# Patient Record
Sex: MALE | Race: WHITE | NOT HISPANIC OR LATINO | Employment: OTHER | URBAN - METROPOLITAN AREA
[De-identification: names, ages, dates, MRNs, and addresses within clinical notes are randomized per-mention and may not be internally consistent; named-entity substitution may affect disease eponyms.]

---

## 2021-07-14 ENCOUNTER — HOSPITAL ENCOUNTER (OUTPATIENT)
Facility: HOSPITAL | Age: 61
Discharge: HOME | End: 2021-07-16
Attending: FAMILY MEDICINE | Admitting: INTERNAL MEDICINE

## 2021-07-14 DIAGNOSIS — I20.0 UNSTABLE ANGINA (HCC): Primary | ICD-10-CM

## 2021-07-14 PROBLEM — R07.9 CHEST PAIN: Status: ACTIVE | Noted: 2021-07-14

## 2021-07-14 PROCEDURE — G0378 HOSPITAL OBSERVATION PER HR: HCPCS

## 2021-07-14 PROCEDURE — 93005 ELECTROCARDIOGRAM TRACING: CPT | Performed by: PHYSICIAN ASSISTANT

## 2021-07-14 PROCEDURE — 99220 PR INITIAL OBSERVATION CARE/DAY 70 MINUTES: CPT | Performed by: FAMILY MEDICINE

## 2021-07-14 PROCEDURE — G0379 DIRECT REFER HOSPITAL OBSERV: HCPCS

## 2021-07-14 NOTE — NURSING NOTE
"  ACC REVIEW REPORT: [facility]        PATIENT NAME: Kenroy Rajan    PATIENT ID: [id number]        COVID-19 ACC SCREENING       DOES THE PATIENT HAVE A FEVER GREATER THAN OR EQUAL .4: NO    IS THE PATIENT EXPERIENCING SHORTNESS OF BREATH: NO    DOES THE PATIENT HAVE A COUGH: NO  DOES THE PATIENT HAVE ANY OF THE FOLLOWING RISK FACTORS:    EXPOSURE TO SUSPECTED OR KNOWN COVID-19: NO    RECENT TRAVEL HISTORY TO ENDEMIC AREA (DOMESTIC/LOCAL): NO    IS THE PATIENT A HEALTHCARE WORKER: NO    HAS THE PATIENT EXPERIENCED A LOSS OF SENSE OF TASTE OR SMELL: NO    HAS THE PATIENT BEEN TESTED FOR COVID-19: YES    DATE TESTED: 07/11/21   IT WAS NEGATIVE    LAB TESTING SENT TO:           BED: 9801    BED TYPE: [facility]    BED GIVEN TO: MYNOR URIBE RN     TIME BED GIVEN: 1340    TODAY'S DATE: 7/14/2021    TRANSFER DATE: 07/14/2021    ETA:     TRANSFERRING FACILITY: [facility]    TRANSFERRING FACILITY PHONE # : [phone]    TRANSFERRING MD:     DATE/TIME REQUEST RECEIVED: 07/14/2021    [facility] RN:  CARLOS MANUEL CAMACHO    REPORT FROM: MYNOR URIBE RN     TIME REPORT TAKEN: 1340    DIAGNOSIS: UNSTABLE ANGINA    REASON FOR TRANSFER TO [facility]: HIGHER LEVEL CARE     TRANSPORTATION: GROUND    CLINICAL REASON FOR TRANSFER TO [facility]: ADMITTED ON THE 10TH FOR BACK PAIN & CHEST PAIN.  HE WAS APPARENTLY SCHEDULED FOR A HEART CATH AT [place] BUT THEY HAD NO BEDS.  A FEW WEEKS AGO HE HAD PNEUMONIA PER REPORT.  HE'S BEEN RUNNING NSR ON THE MONITOR.    HE HAS AN OPIOID DEPENDENCE AND IS ON SUBOXONE .  HIS NURSE SAYS THAT HE'S COMPLAINING MORE ABOUT HIS BACK THAN CHEST PAIN AND WANTS PAIN MEDICATION.  HOWEVER, SHE CONTINUES TO TELL HIM THAT HE CAN'T HAVE ANYTHING ELSE.  HE FINALLY RECEIVED TORADOL AS A \"ONE TIME DOSE.\"       HE IS DIABETIC, HAS COPD, A CABG, HEP B AND HEP C.        CLINICAL INFORMATION    HEIGHT:     WEIGHT:     ALLERGIES: AMITRIPTYLINE, PAXIL, BUSBAR    INFECTIOUS DISEASE:     ISOLATION:     VITAL SIGNS:   TIME: " 1336  TEMP: 97.0  PULSE: 93  B/P: 154/83  RESP: 20  96% ON RMA        LAB INFORMATION: CREAT-1.32, H&H-28.8/9.3    CULTURE INFORMATION:     MEDS/IV FLUIDS: HE WILL HAVE AN IV BEFORE HE LEAVES.  HE TOOK A SHOWER AND APPARENTLY HIS IV CAME OUT.        CARDIAC SYSTEM:    CHEST PAIN:     RATE:     SCALE:     RHYTHM: NSR    Is patient taking or has patient been given any drugs that could increase bleeding? YES    DRUG:  ASA 81 MG    DOSE/FREQUENCY:     TROPONIN:    DATE:   TIME:   TROP:     DATE:   TIME:   TROP:           CARDIAC NOTES: NSR      RESPIRATORY SYSTEM:    LUNG SOUNDS:     ABG DATE:         ABG TIME:     ABG RESULTS:    PH:   PCO2:   PO2:   HCO3:   O2 SAT:       OXYGEN:     O2 SAT:     IMAGING FINDINGS:     PNEUMO CHEST TUBE SEAL TYPE:     RESPIRATORY STATUS:       CNS/MUSCULOSKELETAL    ALERT AND ORIENTED: YES ALERT AND ORIENTED X 4     PERSON:   PLACE:   TIME:     CAT SCAN RESULTS:     MRI RESULTS:     CNS/MUSCULOSKELETAL NOTES:       GI//GY      ABDOMINAL PAIN:    VOMITING:     DIARRHEA:     NAUSEA:     BOWEL SOUNDS:     OCCULT STOOL:     HEMATURIA:     NG TUBE:    SIZE:     DATE INSERTED:       ULTRASOUND RESULTS:     ACUTE ABDOMEN RESULTS:     CT SCAN RESULTS:       GI//GY NOTES:     [place]:     PAST MEDICAL HISTORY: TWO BACK SURGERIES, CHRONIC BACK PAIN, HTN., DIABETES, COPD, OPIOID DEPENDENCE, CABG, HEP B AND HEP C    OTHER SYMPTOM NOTES:     ADDITIONAL NOTES:           Carlie Freire RN  7/14/2021  13:21 EDT

## 2021-07-15 ENCOUNTER — APPOINTMENT (OUTPATIENT)
Dept: CT IMAGING | Facility: HOSPITAL | Age: 61
End: 2021-07-15

## 2021-07-15 ENCOUNTER — APPOINTMENT (OUTPATIENT)
Dept: GENERAL RADIOLOGY | Facility: HOSPITAL | Age: 61
End: 2021-07-15

## 2021-07-15 PROBLEM — I20.0 UNSTABLE ANGINA (HCC): Status: ACTIVE | Noted: 2021-07-15

## 2021-07-15 LAB
ALBUMIN SERPL-MCNC: 3.1 G/DL (ref 3.5–5.2)
ALBUMIN/GLOB SERPL: 0.9 G/DL
ALP SERPL-CCNC: 63 U/L (ref 39–117)
ALT SERPL W P-5'-P-CCNC: 10 U/L (ref 1–41)
ANION GAP SERPL CALCULATED.3IONS-SCNC: 12 MMOL/L (ref 5–15)
AST SERPL-CCNC: 16 U/L (ref 1–40)
BASOPHILS # BLD AUTO: 0.03 10*3/MM3 (ref 0–0.2)
BASOPHILS NFR BLD AUTO: 0.8 % (ref 0–1.5)
BILIRUB SERPL-MCNC: 0.2 MG/DL (ref 0–1.2)
BILIRUB UR QL STRIP: NEGATIVE
BILIRUB UR QL STRIP: NEGATIVE
BUN SERPL-MCNC: 17 MG/DL (ref 8–23)
BUN/CREAT SERPL: 15.7 (ref 7–25)
CALCIUM SPEC-SCNC: 9.2 MG/DL (ref 8.6–10.5)
CHLORIDE SERPL-SCNC: 100 MMOL/L (ref 98–107)
CHOLEST SERPL-MCNC: 135 MG/DL (ref 0–200)
CLARITY UR: CLEAR
CLARITY UR: CLEAR
CO2 SERPL-SCNC: 25 MMOL/L (ref 22–29)
COLOR UR: YELLOW
COLOR UR: YELLOW
CREAT SERPL-MCNC: 1.08 MG/DL (ref 0.76–1.27)
D DIMER PPP FEU-MCNC: 0.62 MCGFEU/ML (ref 0–0.56)
DEPRECATED RDW RBC AUTO: 47.5 FL (ref 37–54)
EOSINOPHIL # BLD AUTO: 0.28 10*3/MM3 (ref 0–0.4)
EOSINOPHIL NFR BLD AUTO: 7 % (ref 0.3–6.2)
ERYTHROCYTE [DISTWIDTH] IN BLOOD BY AUTOMATED COUNT: 14.4 % (ref 12.3–15.4)
FLUAV RNA RESP QL NAA+PROBE: NOT DETECTED
FLUBV RNA RESP QL NAA+PROBE: NOT DETECTED
GFR SERPL CREATININE-BSD FRML MDRD: 70 ML/MIN/1.73
GLOBULIN UR ELPH-MCNC: 3.4 GM/DL
GLUCOSE BLDC GLUCOMTR-MCNC: 117 MG/DL (ref 70–130)
GLUCOSE BLDC GLUCOMTR-MCNC: 118 MG/DL (ref 70–130)
GLUCOSE BLDC GLUCOMTR-MCNC: 301 MG/DL (ref 70–130)
GLUCOSE BLDC GLUCOMTR-MCNC: 350 MG/DL (ref 70–130)
GLUCOSE SERPL-MCNC: 112 MG/DL (ref 65–99)
GLUCOSE UR STRIP-MCNC: ABNORMAL MG/DL
GLUCOSE UR STRIP-MCNC: ABNORMAL MG/DL
HAV IGM SERPL QL IA: ABNORMAL
HBA1C MFR BLD: 10.7 % (ref 4.8–5.6)
HBV CORE IGM SERPL QL IA: ABNORMAL
HBV SURFACE AG SERPL QL IA: ABNORMAL
HCT VFR BLD AUTO: 30.7 % (ref 37.5–51)
HCV AB SER DONR QL: REACTIVE
HDLC SERPL-MCNC: 26 MG/DL (ref 40–60)
HGB BLD-MCNC: 9.6 G/DL (ref 13–17.7)
HGB UR QL STRIP.AUTO: NEGATIVE
HGB UR QL STRIP.AUTO: NEGATIVE
IMM GRANULOCYTES # BLD AUTO: 0.01 10*3/MM3 (ref 0–0.05)
IMM GRANULOCYTES NFR BLD AUTO: 0.3 % (ref 0–0.5)
INR PPP: 1.02 (ref 0.85–1.16)
KETONES UR QL STRIP: NEGATIVE
KETONES UR QL STRIP: NEGATIVE
LDLC SERPL CALC-MCNC: 73 MG/DL (ref 0–100)
LDLC/HDLC SERPL: 2.54 {RATIO}
LEUKOCYTE ESTERASE UR QL STRIP.AUTO: NEGATIVE
LEUKOCYTE ESTERASE UR QL STRIP.AUTO: NEGATIVE
LIPASE SERPL-CCNC: 11 U/L (ref 13–60)
LYMPHOCYTES # BLD AUTO: 1.27 10*3/MM3 (ref 0.7–3.1)
LYMPHOCYTES NFR BLD AUTO: 31.8 % (ref 19.6–45.3)
MAGNESIUM SERPL-MCNC: 1.7 MG/DL (ref 1.6–2.4)
MCH RBC QN AUTO: 28.1 PG (ref 26.6–33)
MCHC RBC AUTO-ENTMCNC: 31.3 G/DL (ref 31.5–35.7)
MCV RBC AUTO: 89.8 FL (ref 79–97)
MONOCYTES # BLD AUTO: 0.44 10*3/MM3 (ref 0.1–0.9)
MONOCYTES NFR BLD AUTO: 11 % (ref 5–12)
NEUTROPHILS NFR BLD AUTO: 1.96 10*3/MM3 (ref 1.7–7)
NEUTROPHILS NFR BLD AUTO: 49.1 % (ref 42.7–76)
NITRITE UR QL STRIP: NEGATIVE
NITRITE UR QL STRIP: NEGATIVE
NRBC BLD AUTO-RTO: 0 /100 WBC (ref 0–0.2)
NT-PROBNP SERPL-MCNC: 106.3 PG/ML (ref 0–900)
PH UR STRIP.AUTO: 7.5 [PH] (ref 5–8)
PH UR STRIP.AUTO: <=5 [PH] (ref 5–8)
PLAT MORPH BLD: NORMAL
PLATELET # BLD AUTO: 262 10*3/MM3 (ref 140–450)
PMV BLD AUTO: 10.9 FL (ref 6–12)
POTASSIUM SERPL-SCNC: 4.5 MMOL/L (ref 3.5–5.2)
PROCALCITONIN SERPL-MCNC: 0.11 NG/ML (ref 0–0.25)
PROT SERPL-MCNC: 6.5 G/DL (ref 6–8.5)
PROT UR QL STRIP: NEGATIVE
PROT UR QL STRIP: NEGATIVE
PROTHROMBIN TIME: 13.1 SECONDS (ref 11.4–14.4)
RBC # BLD AUTO: 3.42 10*6/MM3 (ref 4.14–5.8)
RBC MORPH BLD: NORMAL
SARS-COV-2 RNA RESP QL NAA+PROBE: NOT DETECTED
SODIUM SERPL-SCNC: 137 MMOL/L (ref 136–145)
SP GR UR STRIP: 1.02 (ref 1–1.03)
SP GR UR STRIP: 1.02 (ref 1–1.03)
TRIGL SERPL-MCNC: 215 MG/DL (ref 0–150)
TROPONIN T SERPL-MCNC: 0.01 NG/ML (ref 0–0.03)
TSH SERPL DL<=0.05 MIU/L-ACNC: 1.08 UIU/ML (ref 0.27–4.2)
UROBILINOGEN UR QL STRIP: ABNORMAL
UROBILINOGEN UR QL STRIP: ABNORMAL
VLDLC SERPL-MCNC: 36 MG/DL (ref 5–40)
WBC # BLD AUTO: 3.99 10*3/MM3 (ref 3.4–10.8)
WBC MORPH BLD: NORMAL

## 2021-07-15 PROCEDURE — 94640 AIRWAY INHALATION TREATMENT: CPT

## 2021-07-15 PROCEDURE — 71275 CT ANGIOGRAPHY CHEST: CPT

## 2021-07-15 PROCEDURE — 87636 SARSCOV2 & INF A&B AMP PRB: CPT | Performed by: FAMILY MEDICINE

## 2021-07-15 PROCEDURE — 94799 UNLISTED PULMONARY SVC/PX: CPT

## 2021-07-15 PROCEDURE — 83036 HEMOGLOBIN GLYCOSYLATED A1C: CPT | Performed by: PHYSICIAN ASSISTANT

## 2021-07-15 PROCEDURE — 96365 THER/PROPH/DIAG IV INF INIT: CPT

## 2021-07-15 PROCEDURE — 80074 ACUTE HEPATITIS PANEL: CPT | Performed by: PHYSICIAN ASSISTANT

## 2021-07-15 PROCEDURE — C1894 INTRO/SHEATH, NON-LASER: HCPCS | Performed by: INTERNAL MEDICINE

## 2021-07-15 PROCEDURE — C1769 GUIDE WIRE: HCPCS | Performed by: INTERNAL MEDICINE

## 2021-07-15 PROCEDURE — 25010000002 ONDANSETRON PER 1 MG: Performed by: INTERNAL MEDICINE

## 2021-07-15 PROCEDURE — 81003 URINALYSIS AUTO W/O SCOPE: CPT | Performed by: PHYSICIAN ASSISTANT

## 2021-07-15 PROCEDURE — 99225 PR SBSQ OBSERVATION CARE/DAY 25 MINUTES: CPT | Performed by: INTERNAL MEDICINE

## 2021-07-15 PROCEDURE — 25010000002 MORPHINE PER 10 MG: Performed by: FAMILY MEDICINE

## 2021-07-15 PROCEDURE — 25010000002 MAGNESIUM SULFATE 2 GM/50ML SOLUTION: Performed by: INTERNAL MEDICINE

## 2021-07-15 PROCEDURE — 25010000002 PHENYLEPHRINE 10 MG/ML SOLUTION: Performed by: INTERNAL MEDICINE

## 2021-07-15 PROCEDURE — 93459 L HRT ART/GRFT ANGIO: CPT | Performed by: INTERNAL MEDICINE

## 2021-07-15 PROCEDURE — 96375 TX/PRO/DX INJ NEW DRUG ADDON: CPT

## 2021-07-15 PROCEDURE — 25010000002 HEPARIN (PORCINE) PER 1000 UNITS: Performed by: PHYSICIAN ASSISTANT

## 2021-07-15 PROCEDURE — 25010000002 MIDAZOLAM PER 1 MG: Performed by: INTERNAL MEDICINE

## 2021-07-15 PROCEDURE — 63710000001 INSULIN LISPRO (HUMAN) PER 5 UNITS: Performed by: INTERNAL MEDICINE

## 2021-07-15 PROCEDURE — 85610 PROTHROMBIN TIME: CPT | Performed by: PHYSICIAN ASSISTANT

## 2021-07-15 PROCEDURE — 0 IOPAMIDOL PER 1 ML: Performed by: FAMILY MEDICINE

## 2021-07-15 PROCEDURE — 96366 THER/PROPH/DIAG IV INF ADDON: CPT

## 2021-07-15 PROCEDURE — 82962 GLUCOSE BLOOD TEST: CPT

## 2021-07-15 PROCEDURE — 80061 LIPID PANEL: CPT | Performed by: PHYSICIAN ASSISTANT

## 2021-07-15 PROCEDURE — 25010000002 FENTANYL CITRATE (PF) 50 MCG/ML SOLUTION: Performed by: INTERNAL MEDICINE

## 2021-07-15 PROCEDURE — 84145 PROCALCITONIN (PCT): CPT | Performed by: PHYSICIAN ASSISTANT

## 2021-07-15 PROCEDURE — G0378 HOSPITAL OBSERVATION PER HR: HCPCS

## 2021-07-15 PROCEDURE — 83690 ASSAY OF LIPASE: CPT | Performed by: PHYSICIAN ASSISTANT

## 2021-07-15 PROCEDURE — 0 IOPAMIDOL PER 1 ML: Performed by: INTERNAL MEDICINE

## 2021-07-15 PROCEDURE — 96372 THER/PROPH/DIAG INJ SC/IM: CPT

## 2021-07-15 PROCEDURE — 84484 ASSAY OF TROPONIN QUANT: CPT | Performed by: PHYSICIAN ASSISTANT

## 2021-07-15 PROCEDURE — 71045 X-RAY EXAM CHEST 1 VIEW: CPT

## 2021-07-15 PROCEDURE — 96376 TX/PRO/DX INJ SAME DRUG ADON: CPT

## 2021-07-15 PROCEDURE — 25010000002 HEPARIN (PORCINE) PER 1000 UNITS: Performed by: INTERNAL MEDICINE

## 2021-07-15 PROCEDURE — C1760 CLOSURE DEV, VASC: HCPCS | Performed by: INTERNAL MEDICINE

## 2021-07-15 PROCEDURE — 85379 FIBRIN DEGRADATION QUANT: CPT | Performed by: PHYSICIAN ASSISTANT

## 2021-07-15 PROCEDURE — 80050 GENERAL HEALTH PANEL: CPT | Performed by: PHYSICIAN ASSISTANT

## 2021-07-15 PROCEDURE — 93010 ELECTROCARDIOGRAM REPORT: CPT | Performed by: INTERNAL MEDICINE

## 2021-07-15 PROCEDURE — 83880 ASSAY OF NATRIURETIC PEPTIDE: CPT | Performed by: PHYSICIAN ASSISTANT

## 2021-07-15 PROCEDURE — 81003 URINALYSIS AUTO W/O SCOPE: CPT | Performed by: INTERNAL MEDICINE

## 2021-07-15 PROCEDURE — 83735 ASSAY OF MAGNESIUM: CPT | Performed by: PHYSICIAN ASSISTANT

## 2021-07-15 PROCEDURE — 25010000002 MORPHINE PER 10 MG: Performed by: INTERNAL MEDICINE

## 2021-07-15 PROCEDURE — 85007 BL SMEAR W/DIFF WBC COUNT: CPT | Performed by: PHYSICIAN ASSISTANT

## 2021-07-15 RX ORDER — QUETIAPINE FUMARATE 25 MG/1
25 TABLET, FILM COATED ORAL 2 TIMES DAILY
COMMUNITY

## 2021-07-15 RX ORDER — SODIUM CHLORIDE 9 MG/ML
INJECTION, SOLUTION INTRAVENOUS CONTINUOUS PRN
Status: COMPLETED | OUTPATIENT
Start: 2021-07-15 | End: 2021-07-15

## 2021-07-15 RX ORDER — MIDAZOLAM HYDROCHLORIDE 1 MG/ML
INJECTION INTRAMUSCULAR; INTRAVENOUS AS NEEDED
Status: DISCONTINUED | OUTPATIENT
Start: 2021-07-15 | End: 2021-07-15 | Stop reason: HOSPADM

## 2021-07-15 RX ORDER — QUETIAPINE FUMARATE 25 MG/1
25 TABLET, FILM COATED ORAL 2 TIMES DAILY
Status: DISCONTINUED | OUTPATIENT
Start: 2021-07-15 | End: 2021-07-16 | Stop reason: HOSPADM

## 2021-07-15 RX ORDER — ALPRAZOLAM 0.25 MG/1
0.25 TABLET ORAL 3 TIMES DAILY PRN
Status: DISCONTINUED | OUTPATIENT
Start: 2021-07-15 | End: 2021-07-16 | Stop reason: HOSPADM

## 2021-07-15 RX ORDER — PANTOPRAZOLE SODIUM 40 MG/1
40 TABLET, DELAYED RELEASE ORAL DAILY
Status: DISCONTINUED | OUTPATIENT
Start: 2021-07-15 | End: 2021-07-15

## 2021-07-15 RX ORDER — NICOTINE 21 MG/24HR
1 PATCH, TRANSDERMAL 24 HOURS TRANSDERMAL
Status: DISCONTINUED | OUTPATIENT
Start: 2021-07-15 | End: 2021-07-16 | Stop reason: HOSPADM

## 2021-07-15 RX ORDER — HYDROCODONE BITARTRATE AND ACETAMINOPHEN 5; 325 MG/1; MG/1
1 TABLET ORAL EVERY 4 HOURS PRN
Status: DISCONTINUED | OUTPATIENT
Start: 2021-07-15 | End: 2021-07-16 | Stop reason: HOSPADM

## 2021-07-15 RX ORDER — MULTIPLE VITAMINS W/ MINERALS TAB 9MG-400MCG
1 TAB ORAL DAILY
Status: DISCONTINUED | OUTPATIENT
Start: 2021-07-15 | End: 2021-07-16 | Stop reason: HOSPADM

## 2021-07-15 RX ORDER — SODIUM CHLORIDE 0.9 % (FLUSH) 0.9 %
10 SYRINGE (ML) INJECTION AS NEEDED
Status: DISCONTINUED | OUTPATIENT
Start: 2021-07-15 | End: 2021-07-16 | Stop reason: HOSPADM

## 2021-07-15 RX ORDER — LISINOPRIL 5 MG/1
5 TABLET ORAL DAILY
COMMUNITY
End: 2021-07-16 | Stop reason: HOSPADM

## 2021-07-15 RX ORDER — METOPROLOL TARTRATE 50 MG/1
50 TABLET, FILM COATED ORAL EVERY 12 HOURS SCHEDULED
Status: DISCONTINUED | OUTPATIENT
Start: 2021-07-15 | End: 2021-07-16 | Stop reason: HOSPADM

## 2021-07-15 RX ORDER — HYDROCODONE BITARTRATE AND ACETAMINOPHEN 5; 325 MG/1; MG/1
1 TABLET ORAL ONCE AS NEEDED
Status: COMPLETED | OUTPATIENT
Start: 2021-07-15 | End: 2021-07-15

## 2021-07-15 RX ORDER — SODIUM CHLORIDE 0.9 % (FLUSH) 0.9 %
10 SYRINGE (ML) INJECTION EVERY 12 HOURS SCHEDULED
Status: DISCONTINUED | OUTPATIENT
Start: 2021-07-15 | End: 2021-07-16 | Stop reason: HOSPADM

## 2021-07-15 RX ORDER — HEPARIN SODIUM 5000 [USP'U]/ML
5000 INJECTION, SOLUTION INTRAVENOUS; SUBCUTANEOUS EVERY 8 HOURS SCHEDULED
Status: DISCONTINUED | OUTPATIENT
Start: 2021-07-15 | End: 2021-07-16 | Stop reason: HOSPADM

## 2021-07-15 RX ORDER — DEXTROSE MONOHYDRATE 25 G/50ML
25 INJECTION, SOLUTION INTRAVENOUS
Status: DISCONTINUED | OUTPATIENT
Start: 2021-07-15 | End: 2021-07-16 | Stop reason: HOSPADM

## 2021-07-15 RX ORDER — MAGNESIUM SULFATE HEPTAHYDRATE 40 MG/ML
2 INJECTION, SOLUTION INTRAVENOUS AS NEEDED
Status: DISCONTINUED | OUTPATIENT
Start: 2021-07-15 | End: 2021-07-16 | Stop reason: HOSPADM

## 2021-07-15 RX ORDER — BUPRENORPHINE HYDROCHLORIDE AND NALOXONE HYDROCHLORIDE DIHYDRATE 8; 2 MG/1; MG/1
2 TABLET SUBLINGUAL DAILY
Status: DISCONTINUED | OUTPATIENT
Start: 2021-07-15 | End: 2021-07-16 | Stop reason: HOSPADM

## 2021-07-15 RX ORDER — GABAPENTIN 800 MG/1
800 TABLET ORAL EVERY 6 HOURS SCHEDULED
COMMUNITY
End: 2021-07-16 | Stop reason: HOSPADM

## 2021-07-15 RX ORDER — ALBUTEROL SULFATE 90 UG/1
AEROSOL, METERED RESPIRATORY (INHALATION) EVERY 4 HOURS PRN
COMMUNITY

## 2021-07-15 RX ORDER — ISOSORBIDE MONONITRATE 120 MG/1
120 TABLET, EXTENDED RELEASE ORAL DAILY
COMMUNITY

## 2021-07-15 RX ORDER — ACETAMINOPHEN 650 MG/1
650 SUPPOSITORY RECTAL EVERY 4 HOURS PRN
Status: DISCONTINUED | OUTPATIENT
Start: 2021-07-15 | End: 2021-07-16 | Stop reason: HOSPADM

## 2021-07-15 RX ORDER — FENTANYL CITRATE 50 UG/ML
INJECTION, SOLUTION INTRAMUSCULAR; INTRAVENOUS AS NEEDED
Status: DISCONTINUED | OUTPATIENT
Start: 2021-07-15 | End: 2021-07-15 | Stop reason: HOSPADM

## 2021-07-15 RX ORDER — ALBUTEROL SULFATE 2.5 MG/3ML
2.5 SOLUTION RESPIRATORY (INHALATION)
Status: DISCONTINUED | OUTPATIENT
Start: 2021-07-15 | End: 2021-07-16 | Stop reason: HOSPADM

## 2021-07-15 RX ORDER — DOCUSATE SODIUM 100 MG/1
100 CAPSULE, LIQUID FILLED ORAL 2 TIMES DAILY
COMMUNITY

## 2021-07-15 RX ORDER — NICOTINE POLACRILEX 4 MG
15 LOZENGE BUCCAL
Status: DISCONTINUED | OUTPATIENT
Start: 2021-07-15 | End: 2021-07-16 | Stop reason: HOSPADM

## 2021-07-15 RX ORDER — ATORVASTATIN CALCIUM 40 MG/1
40 TABLET, FILM COATED ORAL NIGHTLY
Status: DISCONTINUED | OUTPATIENT
Start: 2021-07-15 | End: 2021-07-16 | Stop reason: HOSPADM

## 2021-07-15 RX ORDER — MAGNESIUM SULFATE 1 G/100ML
1 INJECTION INTRAVENOUS AS NEEDED
Status: DISCONTINUED | OUTPATIENT
Start: 2021-07-15 | End: 2021-07-16 | Stop reason: HOSPADM

## 2021-07-15 RX ORDER — PANTOPRAZOLE SODIUM 40 MG/1
40 TABLET, DELAYED RELEASE ORAL 2 TIMES DAILY
Status: ON HOLD | COMMUNITY
End: 2021-07-15

## 2021-07-15 RX ORDER — ASPIRIN 81 MG/1
81 TABLET, CHEWABLE ORAL DAILY
Status: DISCONTINUED | OUTPATIENT
Start: 2021-07-15 | End: 2021-07-16 | Stop reason: HOSPADM

## 2021-07-15 RX ORDER — MORPHINE SULFATE 2 MG/ML
2 INJECTION, SOLUTION INTRAMUSCULAR; INTRAVENOUS EVERY 4 HOURS PRN
Status: DISCONTINUED | OUTPATIENT
Start: 2021-07-15 | End: 2021-07-16

## 2021-07-15 RX ORDER — GABAPENTIN 400 MG/1
800 CAPSULE ORAL EVERY 6 HOURS SCHEDULED
Status: DISCONTINUED | OUTPATIENT
Start: 2021-07-15 | End: 2021-07-16 | Stop reason: HOSPADM

## 2021-07-15 RX ORDER — LIDOCAINE HYDROCHLORIDE 10 MG/ML
INJECTION, SOLUTION EPIDURAL; INFILTRATION; INTRACAUDAL; PERINEURAL AS NEEDED
Status: DISCONTINUED | OUTPATIENT
Start: 2021-07-15 | End: 2021-07-15 | Stop reason: HOSPADM

## 2021-07-15 RX ORDER — ACETAMINOPHEN 325 MG/1
650 TABLET ORAL EVERY 4 HOURS PRN
Status: DISCONTINUED | OUTPATIENT
Start: 2021-07-15 | End: 2021-07-16 | Stop reason: HOSPADM

## 2021-07-15 RX ORDER — PHENYLEPHRINE HYDROCHLORIDE 10 MG/ML
INJECTION INTRAVENOUS AS NEEDED
Status: DISCONTINUED | OUTPATIENT
Start: 2021-07-15 | End: 2021-07-15 | Stop reason: HOSPADM

## 2021-07-15 RX ORDER — LISINOPRIL 5 MG/1
5 TABLET ORAL DAILY
Status: DISCONTINUED | OUTPATIENT
Start: 2021-07-15 | End: 2021-07-16 | Stop reason: HOSPADM

## 2021-07-15 RX ORDER — METOPROLOL TARTRATE 50 MG/1
50 TABLET, FILM COATED ORAL 2 TIMES DAILY
COMMUNITY

## 2021-07-15 RX ORDER — PANTOPRAZOLE SODIUM 40 MG/10ML
40 INJECTION, POWDER, LYOPHILIZED, FOR SOLUTION INTRAVENOUS EVERY 12 HOURS SCHEDULED
Status: DISCONTINUED | OUTPATIENT
Start: 2021-07-15 | End: 2021-07-16 | Stop reason: HOSPADM

## 2021-07-15 RX ORDER — LIDOCAINE 50 MG/G
1 PATCH TOPICAL
Status: DISCONTINUED | OUTPATIENT
Start: 2021-07-15 | End: 2021-07-16 | Stop reason: HOSPADM

## 2021-07-15 RX ORDER — TEMAZEPAM 7.5 MG/1
7.5 CAPSULE ORAL NIGHTLY PRN
Status: DISCONTINUED | OUTPATIENT
Start: 2021-07-15 | End: 2021-07-16 | Stop reason: HOSPADM

## 2021-07-15 RX ORDER — ONDANSETRON 2 MG/ML
4 INJECTION INTRAMUSCULAR; INTRAVENOUS EVERY 6 HOURS PRN
Status: DISCONTINUED | OUTPATIENT
Start: 2021-07-15 | End: 2021-07-16 | Stop reason: HOSPADM

## 2021-07-15 RX ORDER — MAGNESIUM SULFATE HEPTAHYDRATE 40 MG/ML
4 INJECTION, SOLUTION INTRAVENOUS AS NEEDED
Status: DISCONTINUED | OUTPATIENT
Start: 2021-07-15 | End: 2021-07-16 | Stop reason: HOSPADM

## 2021-07-15 RX ORDER — ACETAMINOPHEN 160 MG/5ML
650 SOLUTION ORAL EVERY 4 HOURS PRN
Status: DISCONTINUED | OUTPATIENT
Start: 2021-07-15 | End: 2021-07-16 | Stop reason: HOSPADM

## 2021-07-15 RX ORDER — IPRATROPIUM BROMIDE AND ALBUTEROL SULFATE 2.5; .5 MG/3ML; MG/3ML
3 SOLUTION RESPIRATORY (INHALATION) EVERY 4 HOURS PRN
Status: DISCONTINUED | OUTPATIENT
Start: 2021-07-15 | End: 2021-07-16 | Stop reason: HOSPADM

## 2021-07-15 RX ORDER — DOCUSATE SODIUM 100 MG/1
100 CAPSULE, LIQUID FILLED ORAL 2 TIMES DAILY PRN
Status: DISCONTINUED | OUTPATIENT
Start: 2021-07-15 | End: 2021-07-16 | Stop reason: HOSPADM

## 2021-07-15 RX ORDER — OMEPRAZOLE 20 MG/1
20 CAPSULE, DELAYED RELEASE ORAL DAILY
COMMUNITY

## 2021-07-15 RX ORDER — ASPIRIN 81 MG/1
81 TABLET, CHEWABLE ORAL DAILY
COMMUNITY

## 2021-07-15 RX ORDER — ISOSORBIDE MONONITRATE 60 MG/1
120 TABLET, EXTENDED RELEASE ORAL DAILY
Status: DISCONTINUED | OUTPATIENT
Start: 2021-07-15 | End: 2021-07-16 | Stop reason: HOSPADM

## 2021-07-15 RX ORDER — CLOPIDOGREL BISULFATE 75 MG/1
75 TABLET ORAL DAILY
Status: DISCONTINUED | OUTPATIENT
Start: 2021-07-15 | End: 2021-07-16 | Stop reason: HOSPADM

## 2021-07-15 RX ADMIN — GABAPENTIN 800 MG: 400 CAPSULE ORAL at 11:34

## 2021-07-15 RX ADMIN — LISINOPRIL 5 MG: 5 TABLET ORAL at 08:40

## 2021-07-15 RX ADMIN — CLOPIDOGREL BISULFATE 75 MG: 75 TABLET ORAL at 08:39

## 2021-07-15 RX ADMIN — QUETIAPINE FUMARATE 25 MG: 25 TABLET ORAL at 21:09

## 2021-07-15 RX ADMIN — ATORVASTATIN CALCIUM 40 MG: 40 TABLET, FILM COATED ORAL at 21:09

## 2021-07-15 RX ADMIN — SODIUM CHLORIDE, PRESERVATIVE FREE 10 ML: 5 INJECTION INTRAVENOUS at 02:08

## 2021-07-15 RX ADMIN — SODIUM CHLORIDE, PRESERVATIVE FREE 10 ML: 5 INJECTION INTRAVENOUS at 21:11

## 2021-07-15 RX ADMIN — ALBUTEROL SULFATE 2.5 MG: 2.5 SOLUTION RESPIRATORY (INHALATION) at 03:01

## 2021-07-15 RX ADMIN — INSULIN LISPRO 6 UNITS: 100 INJECTION, SOLUTION INTRAVENOUS; SUBCUTANEOUS at 18:22

## 2021-07-15 RX ADMIN — METOPROLOL TARTRATE 50 MG: 50 TABLET, FILM COATED ORAL at 08:44

## 2021-07-15 RX ADMIN — ALBUTEROL SULFATE 2.5 MG: 2.5 SOLUTION RESPIRATORY (INHALATION) at 18:50

## 2021-07-15 RX ADMIN — LIDOCAINE 1 PATCH: 50 PATCH CUTANEOUS at 11:34

## 2021-07-15 RX ADMIN — ONDANSETRON 4 MG: 2 INJECTION INTRAMUSCULAR; INTRAVENOUS at 12:43

## 2021-07-15 RX ADMIN — ALBUTEROL SULFATE 2.5 MG: 2.5 SOLUTION RESPIRATORY (INHALATION) at 07:31

## 2021-07-15 RX ADMIN — TEMAZEPAM 7.5 MG: 7.5 CAPSULE ORAL at 21:09

## 2021-07-15 RX ADMIN — ALBUTEROL SULFATE 2.5 MG: 2.5 SOLUTION RESPIRATORY (INHALATION) at 22:46

## 2021-07-15 RX ADMIN — MORPHINE SULFATE 2 MG: 2 INJECTION, SOLUTION INTRAMUSCULAR; INTRAVENOUS at 04:11

## 2021-07-15 RX ADMIN — QUETIAPINE FUMARATE 25 MG: 25 TABLET ORAL at 02:08

## 2021-07-15 RX ADMIN — GABAPENTIN 800 MG: 400 CAPSULE ORAL at 08:39

## 2021-07-15 RX ADMIN — MORPHINE SULFATE 2 MG: 2 INJECTION, SOLUTION INTRAMUSCULAR; INTRAVENOUS at 08:45

## 2021-07-15 RX ADMIN — MAGNESIUM SULFATE HEPTAHYDRATE 2 G: 2 INJECTION, SOLUTION INTRAVENOUS at 11:52

## 2021-07-15 RX ADMIN — GABAPENTIN 800 MG: 400 CAPSULE ORAL at 02:08

## 2021-07-15 RX ADMIN — METOPROLOL TARTRATE 50 MG: 50 TABLET, FILM COATED ORAL at 02:08

## 2021-07-15 RX ADMIN — HEPARIN SODIUM 5000 UNITS: 5000 INJECTION INTRAVENOUS; SUBCUTANEOUS at 08:45

## 2021-07-15 RX ADMIN — Medication 1 TABLET: at 08:38

## 2021-07-15 RX ADMIN — GABAPENTIN 800 MG: 400 CAPSULE ORAL at 18:22

## 2021-07-15 RX ADMIN — ISOSORBIDE MONONITRATE 120 MG: 60 TABLET, EXTENDED RELEASE ORAL at 08:38

## 2021-07-15 RX ADMIN — BUPRENORPHINE AND NALOXONE 2 TABLET: 8; 2 TABLET SUBLINGUAL at 13:33

## 2021-07-15 RX ADMIN — SODIUM CHLORIDE, PRESERVATIVE FREE 10 ML: 5 INJECTION INTRAVENOUS at 08:39

## 2021-07-15 RX ADMIN — IOPAMIDOL 70 ML: 755 INJECTION, SOLUTION INTRAVENOUS at 05:43

## 2021-07-15 RX ADMIN — PANTOPRAZOLE SODIUM 40 MG: 40 INJECTION, POWDER, FOR SOLUTION INTRAVENOUS at 08:39

## 2021-07-15 RX ADMIN — Medication 1 PATCH: at 02:08

## 2021-07-15 RX ADMIN — QUETIAPINE FUMARATE 25 MG: 25 TABLET ORAL at 08:51

## 2021-07-15 RX ADMIN — ASPIRIN 81 MG: 81 TABLET, CHEWABLE ORAL at 08:38

## 2021-07-15 RX ADMIN — ONDANSETRON 4 MG: 2 INJECTION INTRAMUSCULAR; INTRAVENOUS at 21:08

## 2021-07-15 RX ADMIN — HEPARIN SODIUM 5000 UNITS: 5000 INJECTION INTRAVENOUS; SUBCUTANEOUS at 21:09

## 2021-07-15 RX ADMIN — HYDROCODONE BITARTRATE AND ACETAMINOPHEN 1 TABLET: 5; 325 TABLET ORAL at 16:45

## 2021-07-15 RX ADMIN — MORPHINE SULFATE 2 MG: 2 INJECTION, SOLUTION INTRAMUSCULAR; INTRAVENOUS at 21:08

## 2021-07-15 RX ADMIN — HYDROCODONE BITARTRATE AND ACETAMINOPHEN 1 TABLET: 5; 325 TABLET ORAL at 03:04

## 2021-07-15 NOTE — PLAN OF CARE
Goal Outcome Evaluation:  Plan of Care Reviewed With: patient   Pt  arrived via [facility]. EMS--A/Ox4, VSS, Pt states he goes to a Suboxone clinic due to past opioid abuse. C/O pain in his lower back. He is unable to name all of his medications, so med req done using transfer documentation from [facility]  per RASHEL Moyer. Pt states his spouse can give an accurate list of medications when she gets here tomorrow morning. Upon assessment by Dr. Hickman, the pt reported he was having chest pain. When this writer went to give pt pain medication per order her stated his pain was in his lower back and radiated to his abdomen. Transfer documentation, including a CD are in the pts chart. Pt reports having had Covid while incarcerated in 2020 and has since received the vaccines.       Progress: no change

## 2021-07-15 NOTE — H&P
[facility] Medicine Services  HISTORY AND PHYSICAL    Patient Name: Kenroy Rajan  : 1960  MRN: [id number]  Primary Care Physician: Amari Vera MD  Date of admission: 2021    Subjective   Subjective     Chief Complaint:  Chest pain     HPI:  Kenroy Rajan is a 60 y.o. male with a medical history significant for coronary artery disease s/p CABG and stenting, hypertension, hyperlipidemia, type 2 diabetes mellitus, and chronic back pain on Suboxone who presented to outside hospital on 21 with acute encephalopathy and hyperglycemia.  Upon arrival to the ED, the patient's main complaint was chest pain and nausea. The patient was treated for HHS and evaluated for unstable angina. During his stay in the OSH ED, his glucose improved with fluids and insulin and the troponin's remained negative. CXR demonstrated an improvement in the ground glass infiltrates seen in recent imaging. CT of the abdomen pelvis revealed a small hiatal hernia and gastritis. There were no beds available at [facility] so the patient was transferred to [facility] for higher level of care.     The patient states he has been having worsening chest pain for several days. The pain is substernal and radiates to his left arm. It is associated with diaphoresis and shortness of breath. The discomfort waxes and wanes and he rates it an 8/10 at its worst. He describes it as a squeezing sensation. The patient states he uses oxygen at home PRN. The patient has had a CABG x3 and 3 stents placed. No cardiac intervention in the past 3 years. Currently taking ASA and Plavix. Nitroglycerin relieves some of the pain. At baseline, the patient is pretty sedentary and he does not exert himself. He admits to smoking 1 PPD but denies alcohol or drug use. The patient lives at home with his wife.      COVID Details:        Symptoms: [] NONE [] Fever []  Cough [x] Shortness of breath [] Change in taste or smell  The  patient qualifies to receive the vaccine, but they have not yet received it.    Review of Systems   Constitutional: Positive for diaphoresis. Negative for chills, fatigue and fever.   HENT: Negative for congestion, sore throat and trouble swallowing.    Eyes: Negative for pain and visual disturbance.   Respiratory: Positive for chest tightness and shortness of breath. Negative for cough and wheezing.    Cardiovascular: Positive for chest pain (numbness to the left of strenum, radiates into left arm and numbness in left hand ) and leg swelling. Negative for palpitations.   Gastrointestinal: Positive for abdominal pain. Negative for blood in stool, constipation, diarrhea, nausea and vomiting.   Genitourinary: Negative for difficulty urinating and dysuria.   Musculoskeletal: Positive for arthralgias, back pain and myalgias. Negative for gait problem.   Skin: Negative for rash and wound.   Neurological: Positive for numbness (numbness in left hand ). Negative for dizziness, syncope, speech difficulty, weakness and headaches.   Hematological: Negative for adenopathy. Does not bruise/bleed easily.   Psychiatric/Behavioral: Negative for agitation and confusion.      All other systems reviewed and are negative.     Personal History     Past Medical History:   Diagnosis Date   • AMI (acute myocardial infarction) (CMS/HCC)    • CAD (coronary artery disease)    • Diabetes (CMS/HCC)    • Hepatitis    • Hypertension    • Pneumonia    • PVD (peripheral vascular disease) (CMS/HCC)        Past Surgical History:   Procedure Laterality Date   • LEFT HEART CATH     • NC REANOMAL CORON ART PA ORIGIN BY GRAFT         Family History:  family history includes Diabetes in his brother, brother, brother, sister, and sister; Heart attack in his father; Heart disease in his brother, brother, brother, father, mother, sister, and sister; Seizures in his brother; Sudden death in his father. Otherwise pertinent FHx was reviewed and unremarkable.      Social History:  reports that he has been smoking cigarettes. He has been smoking about 1.00 pack per day. He does not have any smokeless tobacco history on file. He reports previous alcohol use. He reports previous drug use.  Social History     Social History Narrative    Live at home with his wife. Grandchildren live nearby.        Medications:  Buprenorphine HCl, Multiple Vitamins-Minerals, QUEtiapine, albuterol sulfate HFA, aspirin, docusate sodium, gabapentin, insulin detemir, insulin lispro, isosorbide mononitrate, lisinopril, metFORMIN, metoprolol tartrate, and omeprazole    Allergies   Allergen Reactions   • Paroxetine Hcl Anaphylaxis   • Amitriptyline Confusion   • Buspirone Hcl Confusion       Objective   Objective     Vital Signs:   Temp:  [98 °F (36.7 °C)] 98 °F (36.7 °C)  Heart Rate:  [78-84] 81  Resp:  [16] 16  BP: (112)/(70) 112/70    Physical Exam   Constitutional: Awake, alert, well groomed, lying in bed, appears uncomfortable   Eyes: PERRLA, sclerae anicteric, no conjunctival injection, left eyelid drooping  HENT: NCAT, mucous membranes moist, face symmetric, dentition normal   Neck: Supple, no thyromegaly, no lymphadenopathy, trachea midline, no meningeal signs   Respiratory: Clear to auscultation bilaterally, no audible wheezes, nonlabored respirations, able to speak in complete sentences   Cardiovascular: RRR, no murmurs appreciated, palpable PT pulses bilaterally, chest wall tender to palpation  Gastrointestinal: Positive bowel sounds, soft, diffuse abdominal tenderness, no distention, no guarding, no peritoneal signs    Musculoskeletal: No bilateral ankle edema, no clubbing or cyanosis to extremities  Psychiatric: Appropriate affect, cooperative, thought process and content normal   Neurologic: Oriented x 3, moves all extremities, normal tone, strength and sensation symmetric in all extremities, Cranial Nerves grossly intact to confrontation, speech clear  Skin: Cool dry, no rashes, wounds  or lesions noted, turgor normal     Result Review:  I have personally reviewed the results from the time of this admission to 07/14/21 11:53 PM EDT and agree with these findings:  []  Laboratory  []  Microbiology  []  Radiology  []  EKG/Telemetry   []  Cardiology/Vascular   []  Pathology  []  Old records  []  Other:  Most notable findings include: See HPI      LAB RESULTS:      Lab 07/15/21  0120   WBC 3.99   HEMOGLOBIN 9.6*   HEMATOCRIT 30.7*   PLATELETS 262   NEUTROS ABS 1.96   IMMATURE GRANS (ABS) 0.01   LYMPHS ABS 1.27   MONOS ABS 0.44   EOS ABS 0.28   MCV 89.8   PROCALCITONIN 0.11   PROTIME 13.1   INR 1.02   D DIMER QUANT 0.62*         Lab 07/15/21  0120   SODIUM 137   POTASSIUM 4.5   CHLORIDE 100   CO2 25.0   ANION GAP 12.0   BUN 17   CREATININE 1.08   GLUCOSE 112*   CALCIUM 9.2   MAGNESIUM 1.7   HEMOGLOBIN A1C 10.70*   TSH 1.080         Lab 07/15/21  0120   TOTAL PROTEIN 6.5   ALBUMIN 3.10*   GLOBULIN 3.4   ALT (SGPT) 10   AST (SGOT) 16   BILIRUBIN 0.2   ALK PHOS 63   LIPASE 11*         Lab 07/15/21  0120   PROBNP 106.3   TROPONIN T 0.012         Lab 07/15/21  0120   CHOLESTEROL 135   LDL CHOL 73   HDL CHOL 26*   TRIGLYCERIDES 215*               Microbiology Results (last 10 days)     ** No results found for the last 240 hours. **          No radiology results from the last 24 hrs        Assessment/Plan   Assessment & Plan       Unstable angina (CMS/HCC)    Chest pain    Hepatitis    Hypertension    Diabetes (CMS/[facility])    Hyperlipidemia    Kenroy Rajan is a 60 y.o. male with a medical history significant for coronary artery disease s/p CABG and stenting, hypertension, hyperlipidemia, type 2 diabetes mellitus, and chronic back pain on Suboxone who was transferred from an OSH for evaluation of chest pain.    Chest pain   Coronary artery disease s/p CABG  - check a troponin, trend   - obtain ECG now and repeat in the morning   - consult cardiology, patient accepted by Dr. Watkins   - continue ASA, start  Plavix  - NTG prn   - keep npo for possible procedure   - check a1c, flp, tsh, proBNP, and d-dimer  - chest x-ray    - monitor on telemetry with continuous pulse ox     Type 2 diabetes mellitus   - hold home hypoglycemics   - low dose SSI with 15 units of Levemir nightly   - check A1c    Hypertension   - blood pressure controlled   - continue lisinopril, metoprolol with hold parameters    Hyperlipidemia   - continue statin   - check lipid panel    Chronic back pain   - continue Suboxone  - lidocaine patch, heating pad      Tobacco use   - duo-nebs PRN  - nicotine patch   - smoking cessation education     DVT prophylaxis: SQH    CODE STATUS:    Level Of Support Discussed With: Patient  Code Status: CPR  Medical Interventions (Level of Support Prior to Arrest): Full    This note has been completed as part of a split-shared workflow.   Signature: Electronically signed by Teri Nixon PA-C, 07/15/21, 12:23 AM EDT          Attending   Admission Attestation       I have seen and examined the patient, performing an independent face-to-face diagnostic evaluation with plan of care reviewed and developed with the advanced practice clinician (APC).      Brief Summary Statement:   Kenroy Rajan is a 60 y.o. male past medical history of coronary artery disease status post CABG and stents.  Also with history of HTN, HLD, and T2DM, also on Suboxone for chronic low back pain.  Patient was admitted to [facility] on 7/11/2021 with acute encephalopathy and hyperglycemia.  Patient was treated for HHS and unstable angina.  Troponins remain negative. CT of the abdomen and pelvis noted small hiatal hernia and gastritis. Pt was transferred to [facility] for higher level of care.  He was accepted by cardiology and was to be admitted to [facility].  However due to late arrival was not possible to admit patient to [facility].  Patient describes his pain as substernal pain that radiates into his left upper extremity  that is 8 out of 10.  He states that is associated shortness of breath and diaphoresis.  Due to persistent chest pain and history of coronary artery disease patient will be admitted to telemetry and further evaluated by cardiology in the a.m.      Remainder of detailed HPI is as noted by APC and has been reviewed and/or edited by me for completeness.    Attending Physical Exam:  Constitutional: Awake, alert  Eyes: PERRLA, sclerae anicteric, no conjunctival injection  HENT: NCAT, mucous membranes moist  Neck: Supple, no thyromegaly, no lymphadenopathy, trachea midline  Respiratory: Clear to auscultation bilaterally, nonlabored respirations   Cardiovascular: RRR, no murmurs, rubs, or gallops, palpable pedal pulses bilaterally  Gastrointestinal: Positive bowel sounds, soft, nontender, nondistended  Musculoskeletal: No bilateral ankle edema, no clubbing or cyanosis to extremities  Psychiatric: Appropriate affect, cooperative  Neurologic: Oriented x 3, strength symmetric in all extremities, Cranial Nerves grossly intact to confrontation, speech clear  Skin: No rashes    Brief Assessment/Plan :  See detailed assessment and plan developed with APC which I have reviewed and/or edited for completeness.    Admission Status: I believe that this patient meets INPATIENT status due to unstable angina.  I feel patient’s risk for adverse outcomes and need for care warrant INPATIENT evaluation and I predict the patient’s care encounter to likely last beyond 2 midnights.      Katelin Hickman,   07/15/21

## 2021-07-15 NOTE — CONSULTS
Pre-Cardiac Catheterization Report  Cardiovascular Laboratory  [facility]      Patient:  Kenroy Rajan  :  1960  PCP:  Amari Vera MD  PHONE:  [phone]    DATE: 7/15/2021    BRIEF HPI:  Kenroy Rajan is a 60 y.o. male with hypertension, hypercholesterolemia, diabetes mellitus, ongoing tobacco abuse, family history of coronary artery disease, and known coronary artery disease.  He is post previous CABG and stents.  He is complaining of a 1 week history of chest pain which he describes as a squeezing.  He says it is moderate to severe in nature lasting minutes with radiation to his left arm.  Associated symptoms include shortness of breath and dyspnea on exertion.  His symptoms occur randomly and are sometimes relieved with sublingual nitroglycerin and at other times not.  He has been transferred to [facility] for left heart catheterization with possible intervention.    Cardiac Risk Factors:  advanced age (older than 55 for men, 65 for women), diabetes mellitus, dyslipidemia, family history of premature cardiovascular disease, hypertension, male gender, obesity (BMI >= 30 kg/m2), smoking/ tobacco exposure    Anginal class in last 2 weeks:  CCS class III    CHF Class in last 2 weeks:  NYHA Class II    Cardiogenic shock:  no    Cardiac arrest <24 hours:  no    Stress test within last 6 months:   no   Details:    Previous cardiac catheterization:  yes  Details:     Previous CABG:  yes  Details:      Allergies:     IV contrast allergy:  no  Allergies   Allergen Reactions   • Paroxetine Hcl Anaphylaxis   • Amitriptyline Confusion   • Buspirone Hcl Confusion       MEDICATIONS:  Prior to Admission medications    Medication Sig Start Date End Date Taking? Authorizing Provider   albuterol sulfate  (90 Base) MCG/ACT inhaler Inhale Every 4 (Four) Hours As Needed for Wheezing.   Yes Provider, MD Pascual   aspirin 81 MG chewable tablet Chew 81 mg Daily.   Yes Provider,  MD Pascual   BUPRENORPHINE HCL SL Place 16 mg under the tongue Daily.   Yes Provider, Historical, MD   docusate sodium (COLACE) 100 MG capsule Take 100 mg by mouth 2 (Two) Times a Day.   Yes Provider, Historical, MD   gabapentin (NEURONTIN) 800 MG tablet Take 800 mg by mouth Every 6 (Six) Hours.   Yes Provider, Historical, MD   insulin detemir (LEVEMIR) 100 UNIT/ML injection Inject 35 Units under the skin into the appropriate area as directed Daily.   Yes Provider, Historical, MD   insulin lispro (humaLOG) 100 UNIT/ML injection 5-10 Units 3 (Three) Times a Day Before Meals.   Yes Provider, Historical, MD   isosorbide mononitrate (IMDUR) 120 MG 24 hr tablet Take 120 mg by mouth Daily.   Yes Provider, Historical, MD   lisinopril (PRINIVIL,ZESTRIL) 5 MG tablet Take 5 mg by mouth Daily.   Yes Provider, Historical, MD   metFORMIN (GLUCOPHAGE) 1000 MG tablet Take 1,000 mg by mouth 2 (Two) Times a Day With Meals.   Yes Provider, Historical, MD   metoprolol tartrate (LOPRESSOR) 50 MG tablet Take 50 mg by mouth 2 (Two) Times a Day.   Yes Provider, Historical, MD   Multiple Vitamins-Minerals (MULTIVITAMIN ADULT, MINERALS, PO) Take 1 tablet by mouth Daily.   Yes Provider, Historical, MD   omeprazole (priLOSEC) 20 MG capsule Take 20 mg by mouth Daily.   Yes Provider, Historical, MD   QUEtiapine (SEROquel) 25 MG tablet Take 25 mg by mouth 2 (Two) Times a Day.   Yes Provider, Historical, MD   pantoprazole (PROTONIX) 40 MG EC tablet Take 40 mg by mouth 2 (two) times a day.  7/15/21 Yes Provider, Historical, MD       Past medical & surgical history, social and family history reviewed in the electronic medical record.    ROS:  Cardiovascular ROS: positive for - chest pain, dyspnea on exertion and shortness of breath    Physical Exam:    Vitals:   Vitals:    07/15/21 0731   BP:    Pulse:    Resp:    Temp:    SpO2: 97%          07/15/21  0028   Weight: 88.9 kg (196 lb)       General Appearance:    Alert, cooperative, in no acute  distress   Head:    Normocephalic, without obvious abnormality, atraumatic   Eyes:            Lids and lashes normal, conjunctivae and sclerae normal, no   icterus, no pallor, corneas clear, PERRLA   Ears:    Ears appear intact with no abnormalities noted   Neck:   No adenopathy, supple, trachea midline, no thyromegaly, no   carotid bruit, no JVD   Back:     No kyphosis present, no scoliosis present, range of motion normal   Lungs:     Clear to auscultation,respirations regular, even and                unlabored    Heart:    Regular rhythm and normal rate, normal S1 and S2, no         murmur, no gallop, no rub, no click   Chest Wall:    No abnormalities observed   Abdomen:     Normal bowel sounds, no masses, no organomegaly, soft     nontender, nondistended, no guarding, no rebound                tenderness   Rectal:     Deferred   Extremities:   Moves all extremities well, no edema, no cyanosis, no           redness   Pulses:   Pulses palpable and equal bilaterally   Skin:   No bleeding, bruising or rash   Neurologic:   Cranial nerves 2 - 12 grossly intact, sensation intact     Barbaeu Test:  Left: Normal  (oxymetric Allens) Right: Not Assessed     Results from last 7 days   Lab Units 07/15/21  0120   SODIUM mmol/L 137   POTASSIUM mmol/L 4.5   CHLORIDE mmol/L 100   CO2 mmol/L 25.0   BUN mg/dL 17   CREATININE mg/dL 1.08   GLUCOSE mg/dL 112*   CALCIUM mg/dL 9.2     Results from last 7 days   Lab Units 07/15/21  0120   WBC 10*3/mm3 3.99   HEMOGLOBIN g/dL 9.6*   HEMATOCRIT % 30.7*   PLATELETS 10*3/mm3 262     Lab Results   Component Value Date    TRIG 215 (H) 07/15/2021    HDL 26 (L) 07/15/2021    AST 16 07/15/2021    ALT 10 07/15/2021     Results from last 7 days   Lab Units 07/15/21  0120   HEMOGLOBIN A1C % 10.70*       Impression      · Unstable angina    Plan     · Procedure to perform: [facility]  · Planned access: Per RASHEL Lyon   07/15/21  07:58 EDT

## 2021-07-15 NOTE — PROGRESS NOTES
"    [facility] Medicine Services  PROGRESS NOTE    Patient Name: Kenroy Rajan  : 1960  MRN: [id number]    Date of Admission: 2021  Primary Care Physician: Amari Vera MD    Subjective   Subjective     CC:  Chest pain    HPI:  Chronic diffuse body pain. No dyspnea. No chest \"pressure\" currently    ROS:  No f/c  No headache  No focal weakness    Objective   Objective     Vital Signs:   Temp:  [97.9 °F (36.6 °C)-98.2 °F (36.8 °C)] 98.2 °F (36.8 °C)  Heart Rate:  [68-87] 73  Resp:  [16] 16  BP: (112-123)/(70-72) 123/72  Flow (L/min):  [2] 2     Physical Exam:  Constitutional:Alert, oriented x 3, nontoxic appearing  Psych:Normal/appropriate affect  HEENT:Ncat, oroph clear  Neck: neck supple, full range of motion  Neuro: Face symmetric, speech clear, equal , moves all extremities  Cardiac: Rrr; No pretibial pitting edema  Resp: Ctab, normal effort  GI: abd soft, mild epigastric tenderness, no rebound or guarding  Skin: No extremity rash  Musculoskeletal/extremities: no cyanosis extremities; no significant ankle edema      Results Reviewed:  LAB RESULTS:      Lab 07/15/21  0120   WBC 3.99   HEMOGLOBIN 9.6*   HEMATOCRIT 30.7*   PLATELETS 262   NEUTROS ABS 1.96   IMMATURE GRANS (ABS) 0.01   LYMPHS ABS 1.27   MONOS ABS 0.44   EOS ABS 0.28   MCV 89.8   PROCALCITONIN 0.11   PROTIME 13.1   D DIMER QUANT 0.62*         Lab 07/15/21  0120   SODIUM 137   POTASSIUM 4.5   CHLORIDE 100   CO2 25.0   ANION GAP 12.0   BUN 17   CREATININE 1.08   GLUCOSE 112*   CALCIUM 9.2   MAGNESIUM 1.7   HEMOGLOBIN A1C 10.70*   TSH 1.080         Lab 07/15/21  0120   TOTAL PROTEIN 6.5   ALBUMIN 3.10*   GLOBULIN 3.4   ALT (SGPT) 10   AST (SGOT) 16   BILIRUBIN 0.2   ALK PHOS 63   LIPASE 11*         Lab 07/15/21  0120   PROBNP 106.3   TROPONIN T 0.012   PROTIME 13.1   INR 1.02         Lab 07/15/21  0120   CHOLESTEROL 135   LDL CHOL 73   HDL CHOL 26*   TRIGLYCERIDES 215*             Brief Urine Lab Results  (Last " result in the past 365 days)      Color   Clarity   Blood   Leuk Est   Nitrite   Protein   CREAT   Urine HCG        07/15/21 0304 Yellow Clear Negative Negative Negative Negative               Microbiology Results Abnormal     Procedure Component Value - Date/Time    COVID PRE-OP / PRE-PROCEDURE SCREENING ORDER (NO ISOLATION) - Swab, Nasopharynx [[id number]]  (Normal) Collected: 07/15/21 0215    Lab Status: Final result Specimen: Swab from Nasopharynx Updated: 07/15/21 0414    Narrative:      The following orders were created for panel order COVID PRE-OP / PRE-PROCEDURE SCREENING ORDER (NO ISOLATION) - Swab, Nasopharynx.  Procedure                               Abnormality         Status                     ---------                               -----------         ------                     COVID-19 and FLU A/B PCR...[[id number]]  Normal              Final result                 Please view results for these tests on the individual orders.    COVID-19 and FLU A/B PCR - Swab, Nasopharynx [[id number]]  (Normal) Collected: 07/15/21 0215    Lab Status: Final result Specimen: Swab from Nasopharynx Updated: 07/15/21 0414     COVID19 Not Detected     Influenza A PCR Not Detected     Influenza B PCR Not Detected    Narrative:      Fact sheet for providers: https://www.fda.gov/media/886909/download    Fact sheet for patients: https://www.fda.gov/media/515080/download    Test performed by PCR.          XR Chest 1 View    Result Date: 7/15/2021  CR Chest 1 Vw INDICATION: Chest pain today. COMPARISON:  None available. FINDINGS: Single portable AP view(s) of the chest. The heart and mediastinal contours are normal. The lungs are clear. No pneumothorax or pleural effusion. Patient is status post CABG.     Impression: No acute cardiopulmonary findings. Signer Name: Nathanael Courtney MD  Signed: 7/15/2021 1:09 AM  Workstation Name: [email or web address]  [facility]    CT Angiogram Chest    Result Date: 7/15/2021  CTA  Chest INDICATION: Acute onset of chest pain TECHNIQUE: CT angiogram of the chest with 100 IV contrast. 3-D reconstructions were obtained and reviewed.   Radiation dose reduction techniques included automated exposure control or exposure modulation based on body size. Count of known CT and cardiac nuc med studies performed in previous 12 months: 0. COMPARISON: No pertinent prior study FINDINGS: Contrast timing is appropriate for adequate sensitivity. The main pulmonary trunk is of normal caliber. No filling defects in the central, lobar, or segmental pulmonary arteries. No interventricular septal bowing or hepatic reflux of contrast to suggest right heart strain. Heart size is normal. No pericardial effusion. The aorta is non aneurysmal without evidence of dissection. Central airways are patent. No endobronchial lesions. Minimal bibasilar atelectasis. No focal consolidation. No pleural effusion or pneumothorax. No threshold enlarged mediastinal, hilar or axillary lymph nodes. No acute findings in visualized upper abdomen. Regional osseous structures show no acute or aggressive bone lesions.     Impression: Impression: 1. No evidence of pulmonary embolus on this study. 2. No acute radiographic abnormality is demonstrated. Signer Name: Trung Soni MD  Signed: 7/15/2021 5:55 AM  Workstation Name: [id number]  [facility]          I have reviewed the medications:  Scheduled Meds:albuterol, 2.5 mg, Nebulization, Q4H - RT  aspirin, 81 mg, Oral, Daily  buprenorphine-naloxone, 2 tablet, Sublingual, Daily  clopidogrel, 75 mg, Oral, Daily  gabapentin, 800 mg, Oral, Q6H  heparin (porcine), 5,000 Units, Subcutaneous, Q8H  [START ON 7/16/2021] insulin detemir, 15 Units, Subcutaneous, Nightly  insulin lispro, 0-7 Units, Subcutaneous, TID AC  isosorbide mononitrate, 120 mg, Oral, Daily  lidocaine, 1 patch, Transdermal, Q24H  lisinopril, 5 mg, Oral, Daily  metoprolol tartrate, 50 mg, Oral,  Q12H  multivitamin with minerals, 1 tablet, Oral, Daily  nicotine, 1 patch, Transdermal, Q24H  pantoprazole, 40 mg, Oral, Daily  QUEtiapine, 25 mg, Oral, BID  sodium chloride, 10 mL, Intravenous, Q12H      Continuous Infusions:   PRN Meds:.•  acetaminophen **OR** acetaminophen **OR** acetaminophen  •  dextrose  •  dextrose  •  docusate sodium  •  glucagon (human recombinant)  •  ipratropium-albuterol  •  Morphine  •  sodium chloride    Assessment/Plan   Assessment & Plan     Active Hospital Problems    Diagnosis  POA   • **Unstable angina (CMS/HCC) [I20.0]  Unknown   • Hepatitis [K75.9]  Yes   • Hypertension [I10]  Yes   • Diabetes (CMS/HCC) [E11.9]  Yes   • Hyperlipidemia [E78.5]  Yes   • Chest pain [R07.9]  Yes      Resolved Hospital Problems   No resolved problems to display.        Brief Hospital Course to date:  Kenroy Rajan is a 60 y.o. male smoker  w/ hx cad (previous cabg & stenting), htn, hl, dm2 who presented to outside hospital ED 7/11/21 w/ confusion, chest pain and nausea. Patient had profound hyperglycemia and was treated for HHS and unstable angina w/ mentation and glucose normalizing w/ fluids and insulin and troponins remained negative. Ct a/p revealed gastritis and small hiatal hernia. Patient had been awaiting transfer to higher level of care and no beds were available at nearby [facility] and patient was ultimately accepted here at [facility] by cardiology Dr. Watkins. Patient reportedly worsening chest pain for several days radiating to left arm w/ associated diaphoresis and dyspnea.Upon arrival here Ekg nsr w/ nonspecific flattening inferiorly (no prior for comparison), troponin negative; D-dimer minimally evelated a 0.62.Ct angio chest negative. Lipase & LfT's ok. Had LHC performed 7/15/21 which showed patents grafts and normal EF. Initiated on bid ppi    Non cardiac chest pain  Hx CAD (previous cabg/stents)  -s/p LHC 7/15/21 by Dr. Watkins: patent 3/3 bypass grafts w/ acceptable  "revascularization and normal LV systolic function  -ct angio chest negative for PE  -dapt, statin, lisinopril, metoprolol, imdur  HTN  DM2 uncontrolled (a1c 10.7)  -adjust insulins prn  Chronic back pain  Hx previous opioid abuse/current suboxone use  -continue neurontin & home suboxone  Hiatal hernia and gastritis (on outside ct a/p)  -on bid ppi; avoid nsaids  Hep C + ab  -completed therapy for this per patient report  Normocytic anemia (hgb 9.6 at admission)  Ongoing tobacco abuse    Plan:  -heart cath \"negative\", ct angio \"negative\"  -bid ppi, avoid nsaids for possible gi source (outside ct a/p w/ gastritis)  -start diet  -titrate insulins  -if tolerates diet and stable then possibly d/c tomorrow    Am labs: cbc,bmp    DVT prophylaxis:  Medical DVT prophylaxis orders are present. sq heparin      Disposition: I expect the patient to be discharged TBD    CODE STATUS:   Code Status and Medical Interventions:   Ordered at: 07/15/21 0035     Level Of Support Discussed With:    Patient     Code Status:    CPR     Medical Interventions (Level of Support Prior to Arrest):    Full       Jason Huggins MD  07/15/21              "

## 2021-07-16 VITALS
SYSTOLIC BLOOD PRESSURE: 134 MMHG | TEMPERATURE: 98.8 F | BODY MASS INDEX: 32.65 KG/M2 | DIASTOLIC BLOOD PRESSURE: 78 MMHG | OXYGEN SATURATION: 99 % | RESPIRATION RATE: 20 BRPM | HEART RATE: 62 BPM | HEIGHT: 65 IN | WEIGHT: 196 LBS

## 2021-07-16 PROBLEM — B18.2 HEPATITIS C, CHRONIC (HCC): Status: ACTIVE | Noted: 2021-07-16

## 2021-07-16 LAB
ANION GAP SERPL CALCULATED.3IONS-SCNC: 12 MMOL/L (ref 5–15)
BUN SERPL-MCNC: 17 MG/DL (ref 8–23)
BUN/CREAT SERPL: 13.3 (ref 7–25)
CALCIUM SPEC-SCNC: 9 MG/DL (ref 8.6–10.5)
CHLORIDE SERPL-SCNC: 99 MMOL/L (ref 98–107)
CO2 SERPL-SCNC: 20 MMOL/L (ref 22–29)
CREAT SERPL-MCNC: 1.28 MG/DL (ref 0.76–1.27)
DEPRECATED RDW RBC AUTO: 47.9 FL (ref 37–54)
ERYTHROCYTE [DISTWIDTH] IN BLOOD BY AUTOMATED COUNT: 14.2 % (ref 12.3–15.4)
GFR SERPL CREATININE-BSD FRML MDRD: 57 ML/MIN/1.73
GLUCOSE BLDC GLUCOMTR-MCNC: 277 MG/DL (ref 70–130)
GLUCOSE BLDC GLUCOMTR-MCNC: 315 MG/DL (ref 70–130)
GLUCOSE SERPL-MCNC: 319 MG/DL (ref 65–99)
HCT VFR BLD AUTO: 32.2 % (ref 37.5–51)
HGB BLD-MCNC: 9.7 G/DL (ref 13–17.7)
MAGNESIUM SERPL-MCNC: 2 MG/DL (ref 1.6–2.4)
MCH RBC QN AUTO: 28 PG (ref 26.6–33)
MCHC RBC AUTO-ENTMCNC: 30.1 G/DL (ref 31.5–35.7)
MCV RBC AUTO: 93.1 FL (ref 79–97)
PLATELET # BLD AUTO: 245 10*3/MM3 (ref 140–450)
PMV BLD AUTO: 10.8 FL (ref 6–12)
POTASSIUM SERPL-SCNC: 5.4 MMOL/L (ref 3.5–5.2)
RBC # BLD AUTO: 3.46 10*6/MM3 (ref 4.14–5.8)
SODIUM SERPL-SCNC: 131 MMOL/L (ref 136–145)
WBC # BLD AUTO: 4.22 10*3/MM3 (ref 3.4–10.8)

## 2021-07-16 PROCEDURE — G0378 HOSPITAL OBSERVATION PER HR: HCPCS

## 2021-07-16 PROCEDURE — 63710000001 INSULIN LISPRO (HUMAN) PER 5 UNITS: Performed by: INTERNAL MEDICINE

## 2021-07-16 PROCEDURE — 80048 BASIC METABOLIC PNL TOTAL CA: CPT | Performed by: INTERNAL MEDICINE

## 2021-07-16 PROCEDURE — 25010000002 HEPARIN (PORCINE) PER 1000 UNITS: Performed by: INTERNAL MEDICINE

## 2021-07-16 PROCEDURE — 82962 GLUCOSE BLOOD TEST: CPT

## 2021-07-16 PROCEDURE — 99217 PR OBSERVATION CARE DISCHARGE MANAGEMENT: CPT | Performed by: HOSPITALIST

## 2021-07-16 PROCEDURE — 83735 ASSAY OF MAGNESIUM: CPT | Performed by: INTERNAL MEDICINE

## 2021-07-16 PROCEDURE — 25010000002 ONDANSETRON PER 1 MG: Performed by: INTERNAL MEDICINE

## 2021-07-16 PROCEDURE — 85027 COMPLETE CBC AUTOMATED: CPT | Performed by: INTERNAL MEDICINE

## 2021-07-16 PROCEDURE — 94799 UNLISTED PULMONARY SVC/PX: CPT

## 2021-07-16 RX ORDER — CLOPIDOGREL BISULFATE 75 MG/1
75 TABLET ORAL DAILY
Qty: 30 TABLET | Refills: 0 | Status: SHIPPED | OUTPATIENT
Start: 2021-07-17 | End: 2021-08-16

## 2021-07-16 RX ORDER — ATORVASTATIN CALCIUM 40 MG/1
40 TABLET, FILM COATED ORAL NIGHTLY
Qty: 30 TABLET | Refills: 0 | Status: SHIPPED | OUTPATIENT
Start: 2021-07-16 | End: 2021-08-15

## 2021-07-16 RX ORDER — GABAPENTIN 400 MG/1
400 CAPSULE ORAL 3 TIMES DAILY
Qty: 90 CAPSULE | Refills: 0 | Status: SHIPPED | OUTPATIENT
Start: 2021-07-16 | End: 2021-08-15

## 2021-07-16 RX ADMIN — GABAPENTIN 800 MG: 400 CAPSULE ORAL at 11:20

## 2021-07-16 RX ADMIN — METOPROLOL TARTRATE 50 MG: 50 TABLET, FILM COATED ORAL at 09:02

## 2021-07-16 RX ADMIN — ALBUTEROL SULFATE 2.5 MG: 2.5 SOLUTION RESPIRATORY (INHALATION) at 03:25

## 2021-07-16 RX ADMIN — GABAPENTIN 800 MG: 400 CAPSULE ORAL at 00:28

## 2021-07-16 RX ADMIN — HYDROCODONE BITARTRATE AND ACETAMINOPHEN 1 TABLET: 5; 325 TABLET ORAL at 11:20

## 2021-07-16 RX ADMIN — ISOSORBIDE MONONITRATE 120 MG: 60 TABLET, EXTENDED RELEASE ORAL at 09:01

## 2021-07-16 RX ADMIN — ALBUTEROL SULFATE 2.5 MG: 2.5 SOLUTION RESPIRATORY (INHALATION) at 07:31

## 2021-07-16 RX ADMIN — PANTOPRAZOLE SODIUM 40 MG: 40 INJECTION, POWDER, FOR SOLUTION INTRAVENOUS at 09:08

## 2021-07-16 RX ADMIN — ALPRAZOLAM 0.25 MG: 0.25 TABLET ORAL at 14:22

## 2021-07-16 RX ADMIN — SODIUM CHLORIDE, PRESERVATIVE FREE 10 ML: 5 INJECTION INTRAVENOUS at 09:04

## 2021-07-16 RX ADMIN — ONDANSETRON 4 MG: 2 INJECTION INTRAMUSCULAR; INTRAVENOUS at 12:17

## 2021-07-16 RX ADMIN — SODIUM ZIRCONIUM CYCLOSILICATE 10 G: 10 POWDER, FOR SUSPENSION ORAL at 12:40

## 2021-07-16 RX ADMIN — HYDROCODONE BITARTRATE AND ACETAMINOPHEN 1 TABLET: 5; 325 TABLET ORAL at 04:28

## 2021-07-16 RX ADMIN — GABAPENTIN 800 MG: 400 CAPSULE ORAL at 04:28

## 2021-07-16 RX ADMIN — ASPIRIN 81 MG: 81 TABLET, CHEWABLE ORAL at 09:02

## 2021-07-16 RX ADMIN — CLOPIDOGREL BISULFATE 75 MG: 75 TABLET ORAL at 09:02

## 2021-07-16 RX ADMIN — ONDANSETRON 4 MG: 2 INJECTION INTRAMUSCULAR; INTRAVENOUS at 03:50

## 2021-07-16 RX ADMIN — LIDOCAINE 1 PATCH: 50 PATCH CUTANEOUS at 09:03

## 2021-07-16 RX ADMIN — INSULIN LISPRO 4 UNITS: 100 INJECTION, SOLUTION INTRAVENOUS; SUBCUTANEOUS at 08:00

## 2021-07-16 RX ADMIN — Medication 1 PATCH: at 09:02

## 2021-07-16 RX ADMIN — HEPARIN SODIUM 5000 UNITS: 5000 INJECTION INTRAVENOUS; SUBCUTANEOUS at 04:28

## 2021-07-16 RX ADMIN — INSULIN LISPRO 5 UNITS: 100 INJECTION, SOLUTION INTRAVENOUS; SUBCUTANEOUS at 12:17

## 2021-07-16 RX ADMIN — BUPRENORPHINE AND NALOXONE 2 TABLET: 8; 2 TABLET SUBLINGUAL at 09:02

## 2021-07-16 RX ADMIN — Medication 1 TABLET: at 09:02

## 2021-07-16 RX ADMIN — LISINOPRIL 5 MG: 5 TABLET ORAL at 09:02

## 2021-07-16 RX ADMIN — QUETIAPINE FUMARATE 25 MG: 25 TABLET ORAL at 09:08

## 2021-07-16 NOTE — DISCHARGE SUMMARY
[facility] Medicine Services  DISCHARGE SUMMARY    Patient Name: Kenroy Rajan  : 1960  MRN: [id number]    Date of Admission: 2021  8:37 PM  Date of Discharge:  2021 (Friday)  Primary Care Physician: Amari Vera MD    Consults     Date and Time Order Name Status Description    7/15/2021 12:35 AM Inpatient Cardiology Consult Completed         Hernán Watkins MD - Cardiology - left Heart Cath - 7/15 (Thursday)    Hospital Course     Presenting Problem:   Chest pain [R07.9]    Active Hospital Problems    Diagnosis  POA   • **Unstable angina (CMS/HCC) [I20.0]  Unknown   • Hepatitis C, chronic (CMS/HCC) [B18.2]  Yes   • Hepatitis [K75.9]  Yes   • Hypertension [I10]  Yes   • Diabetes (CMS/HCC) [E11.9]  Yes   • Hyperlipidemia [E78.5]  Yes   • Chest pain [R07.9]  Yes      Resolved Hospital Problems   No resolved problems to display.      Poor Sleep  Hyperkalemia  Uncontrolled DM  Opioid dependence    Hospital Course:  Kenroy Rajan is a 60 y.o. male with history of Multivessel CAD who presented as a transfer from [facility] for Un[facility].    Patient had a left heart cath due to recurrent chest pain refractory to medical therapy in patient with a previous CABG on 7/15/2021.    He had 3 out of 3 patent bypass grafts with acceptable re-vascularization and normal LV systolic function on Left hearth cath with Dr. Watkins.    He has been cleared for home and wants to go home today.    Discharge Follow Up Recommendations for outpatient labs/diagnostics:   outpatient     Day of Discharge     HPI:   Anxious to go home before I even arrived.  Called later in the morning about patient who is restless and anxious to be discharged.    Review of Systems    Gen- No fevers, chills  CV- No chest pain, palpitations  Resp- No cough, dyspnea  GI- No N/V/D, abd pain    Vital Signs:   Temp:  [98.5 °F (36.9 °C)-98.8 °F (37.1 °C)] 98.8 °F (37.1 °C)  Heart Rate:  [61-84] 61  Resp:   [17-20] 20  BP: ()/(49-94) 134/78     Physical Exam:    Constitutional: No acute distress, awake, alert  HENT: NCAT, dry tongue  Respiratory: Clear to auscultation bilaterally, respiratory effort normal   Cardiovascular: RRR, no murmurs, rubs, or gallops  Gastrointestinal: Positive bowel sounds, soft, nontender, nondistended  Musculoskeletal: No bilateral ankle edema  Psychiatric: Appropriate affect, cooperative  Neurologic: Oriented x 3, strength symmetric in all extremities, Cranial Nerves grossly intact to confrontation, speech clear  Skin: No rashes    Pertinent  and/or Most Recent Results     LAB RESULTS:      Lab 07/16/21  0347 07/15/21  0120   WBC 4.22 3.99   HEMOGLOBIN 9.7* 9.6*   HEMATOCRIT 32.2* 30.7*   PLATELETS 245 262   NEUTROS ABS  --  1.96   IMMATURE GRANS (ABS)  --  0.01   LYMPHS ABS  --  1.27   MONOS ABS  --  0.44   EOS ABS  --  0.28   MCV 93.1 89.8   PROCALCITONIN  --  0.11   PROTIME  --  13.1   D DIMER QUANT  --  0.62*         Lab 07/16/21  0347 07/15/21  0120   SODIUM 131* 137   POTASSIUM 5.4* 4.5   CHLORIDE 99 100   CO2 20.0* 25.0   ANION GAP 12.0 12.0   BUN 17 17   CREATININE 1.28* 1.08   GLUCOSE 319* 112*   CALCIUM 9.0 9.2   MAGNESIUM 2.0 1.7   HEMOGLOBIN A1C  --  10.70*   TSH  --  1.080         Lab 07/15/21  0120   TOTAL PROTEIN 6.5   ALBUMIN 3.10*   GLOBULIN 3.4   ALT (SGPT) 10   AST (SGOT) 16   BILIRUBIN 0.2   ALK PHOS 63   LIPASE 11*         Lab 07/15/21  0120   PROBNP 106.3   TROPONIN T 0.012   PROTIME 13.1   INR 1.02         Lab 07/15/21  0120   CHOLESTEROL 135   LDL CHOL 73   HDL CHOL 26*   TRIGLYCERIDES 215*             Brief Urine Lab Results  (Last result in the past 365 days)      Color   Clarity   Blood   Leuk Est   Nitrite   Protein   CREAT   Urine HCG        07/15/21 1138 Yellow Clear Negative Negative Negative Negative             Microbiology Results (last 10 days)     Procedure Component Value - Date/Time    COVID PRE-OP / PRE-PROCEDURE SCREENING ORDER (NO ISOLATION) -  Swab, Nasopharynx [[id number]]  (Normal) Collected: 07/15/21 0215    Lab Status: Final result Specimen: Swab from Nasopharynx Updated: 07/15/21 0414    Narrative:      The following orders were created for panel order COVID PRE-OP / PRE-PROCEDURE SCREENING ORDER (NO ISOLATION) - Swab, Nasopharynx.  Procedure                               Abnormality         Status                     ---------                               -----------         ------                     COVID-19 and FLU A/B PCR...[[id number]]  Normal              Final result                 Please view results for these tests on the individual orders.    COVID-19 and FLU A/B PCR - Swab, Nasopharynx [[id number]]  (Normal) Collected: 07/15/21 0215    Lab Status: Final result Specimen: Swab from Nasopharynx Updated: 07/15/21 0414     COVID19 Not Detected     Influenza A PCR Not Detected     Influenza B PCR Not Detected    Narrative:      Fact sheet for providers: https://www.fda.gov/media/763785/download    Fact sheet for patients: https://www.fda.gov/media/534407/download    Test performed by PCR.          Cardiac Catheterization/Vascular Study    Result Date: 7/15/2021  Left heart catheterization coronary angiography left ventriculography Indication: 60-year-old with previous CABG and recurrent chest pain refractory to medical therapy. The procedure performed the right groin.  Using modified Seldinger technique a 6 Kiswahili sheath was placed in the right femoral artery and flushed with heparinized saline.  6 Kiswahili Lorne catheters were used to perform angiography and ventriculography.  At the completion of procedure catheters removed and a femoral angiogram obtained.  The sheath was removed with Angio-Seal closure.  No complications were noted Hemodynamic data: The ascending aortic pressure was 100/60.  LVEDP was 8.  No gradient was noted across the aortic valve on pullback. Left ventriculography: Regional wall motion was normal and the LVEF was 60%.  Coronary angiography Left main coronary artery: Patient had ostial 50 to 60% stenosis prior to the left main normalizing Left anterior descending coronary artery: There was competitive flow into the LAD from its mid segment to the apex from a patent LIMA graft.  Luminal irregularity was noted only in the main body of the LAD.  The major diagonal and major septal  branches were normal Circumflex coronary artery: Small nondominant vessel occluded 100% in its mid segment. Right coronary artery: Large vessel dominant for the posterior circulation.  There is 70% tubular stenosis in the mid right coronary artery.  There is competitive flow into the posterior descending branch from the vein graft. Bypass grafts: The saphenous vein graft from the aorta to the PDA is widely patent.  It backfills the entire posterolateral system and there is antegrade flow into the PDA with no obstruction.  No vein graft disease is noted The vein graft from the aorta to the circumflex marginal branch is widely patent.  There is no vein graft disease and flow into the distal marginal branch is normal with no obstruction. Left internal mammary artery graft to the mid LAD is widely patent with MONIQUE-3 distal flow out to the apex. Conclusion: Patient has 3 out of 3 patent bypass grafts with acceptable revascularization and normal LV systolic function.  Would search for noncardiac etiology of the patient's chest pain.    XR Chest 1 View    Result Date: 7/15/2021  CR Chest 1 Vw INDICATION: Chest pain today. COMPARISON:  None available. FINDINGS: Single portable AP view(s) of the chest. The heart and mediastinal contours are normal. The lungs are clear. No pneumothorax or pleural effusion. Patient is status post CABG.     No acute cardiopulmonary findings. Signer Name: Nathanael Courtney MD  Signed: 7/15/2021 1:09 AM  Workstation Name: [email or web address]  [facility]    CT Angiogram Chest    Result Date: 7/15/2021  CTA Chest  INDICATION: Acute onset of chest pain TECHNIQUE: CT angiogram of the chest with 100 IV contrast. 3-D reconstructions were obtained and reviewed.   Radiation dose reduction techniques included automated exposure control or exposure modulation based on body size. Count of known CT and cardiac nuc med studies performed in previous 12 months: 0. COMPARISON: No pertinent prior study FINDINGS: Contrast timing is appropriate for adequate sensitivity. The main pulmonary trunk is of normal caliber. No filling defects in the central, lobar, or segmental pulmonary arteries. No interventricular septal bowing or hepatic reflux of contrast to suggest right heart strain. Heart size is normal. No pericardial effusion. The aorta is non aneurysmal without evidence of dissection. Central airways are patent. No endobronchial lesions. Minimal bibasilar atelectasis. No focal consolidation. No pleural effusion or pneumothorax. No threshold enlarged mediastinal, hilar or axillary lymph nodes. No acute findings in visualized upper abdomen. Regional osseous structures show no acute or aggressive bone lesions.     Impression: 1. No evidence of pulmonary embolus on this study. 2. No acute radiographic abnormality is demonstrated. Signer Name: Trung Soni MD  Signed: 7/15/2021 5:55 AM  Workstation Name: [id number]  [facility]                  Discharge Details        Discharge Medications      New Medications      Instructions Start Date   atorvastatin 40 MG tablet  Commonly known as: LIPITOR   40 mg, Oral, Nightly      clopidogrel 75 MG tablet  Commonly known as: PLAVIX   75 mg, Oral, Daily   Start Date: July 17, 2021     gabapentin 400 MG capsule  Commonly known as: NEURONTIN  Replaces: gabapentin 800 MG tablet   400 mg, Oral, 3 Times Daily         Continue These Medications      Instructions Start Date   albuterol sulfate  (90 Base) MCG/ACT inhaler  Commonly known as: PROVENTIL HFA;VENTOLIN HFA;PROAIR  HFA   Inhalation, Every 4 Hours PRN      aspirin 81 MG chewable tablet   81 mg, Oral, Daily      BUPRENORPHINE HCL SL   16 mg, Sublingual, Daily      docusate sodium 100 MG capsule  Commonly known as: COLACE   100 mg, Oral, 2 Times Daily      insulin detemir 100 UNIT/ML injection  Commonly known as: LEVEMIR   35 Units, Subcutaneous, Daily      insulin lispro 100 UNIT/ML injection  Commonly known as: humaLOG   5-10 Units, 3 Times Daily Before Meals      isosorbide mononitrate 120 MG 24 hr tablet  Commonly known as: IMDUR   120 mg, Oral, Daily      metFORMIN 1000 MG tablet  Commonly known as: GLUCOPHAGE   1,000 mg, Oral, 2 Times Daily With Meals      metoprolol tartrate 50 MG tablet  Commonly known as: LOPRESSOR   50 mg, Oral, 2 Times Daily      MULTIVITAMIN ADULT (MINERALS) PO   1 tablet, Oral, Daily      omeprazole 20 MG capsule  Commonly known as: priLOSEC   20 mg, Oral, Daily      QUEtiapine 25 MG tablet  Commonly known as: SEROquel   25 mg, Oral, 2 Times Daily         Stop These Medications    gabapentin 800 MG tablet  Commonly known as: NEURONTIN  Replaced by: gabapentin 400 MG capsule     lisinopril 5 MG tablet  Commonly known as: PRINIVIL,ZESTRIL          Allergies   Allergen Reactions   • Paroxetine Hcl Anaphylaxis   • Amitriptyline Confusion   • Buspirone Hcl Confusion     Discharge Disposition:  Home or Self Care    Diet:  Hospital:  Diet Order   Procedures   • Diet Regular; Cardiac, Consistent Carbohydrate     Activity:  As tolerated    Restrictions or Other Recommendations:   Sleep issues - needs sleep study - likely has central apnea from Suboxone       CODE STATUS:    Code Status and Medical Interventions:   Ordered at: 07/15/21 0035     Level Of Support Discussed With:    Patient     Code Status:    CPR     Medical Interventions (Level of Support Prior to Arrest):    Full     No future appointments.      Discussed stopping lisinopril due to marginal blood pressures, hyperkalemia, and acute increase of  creatinine on the day of discharge.    He did get a contrast load with Cardiac Cath which may also be contributing.    Decreased gabapentin to protect kidneys as this medication is metabolized and excreted by kidneys    He will need follow up in [facility] Clinic in [place], [place].    José Miguel Lim MD  07/16/21      Time Spent on Discharge:  I spent  43  minutes on this discharge activity which included: face-to-face encounter with the patient, reviewing the data in the system, coordination of the care with the nursing staff as well as consultants, documentation, and entering orders.

## 2021-07-16 NOTE — CASE MANAGEMENT/SOCIAL WORK
Continued Stay Note  [facility]     Patient Name: Kenroy Rajan  MRN: [id number]  Today's Date: 7/16/2021    Admit Date: 7/14/2021    Discharge Plan     Row Name 07/16/21 1218       Plan    Plan  Home- back to [facility]    Plan Comments  Pt is a well established patient at a [facility] Clinic in [place], [place]- for approx 6 years.  No resources needed.  Nursing, thank you for asking me to see this patient to ensure he had an appropriate linkage to clinic.        Discharge Codes    No documentation.       Expected Discharge Date and Time     Expected Discharge Date Expected Discharge Time    Jul 16, 2021             Jamila Hogan RN ,BSN   Addiction Coordinator

## 2021-07-16 NOTE — PROGRESS NOTES
[facility]       LOS: 0 days   Patient Care Team:  Amari Vera MD as PCP - General (Family Medicine)        Subjective       Patient Denies:  Cp, sob, palpitations.      Vital Signs  Temp:  [98.5 °F (36.9 °C)-98.8 °F (37.1 °C)] 98.8 °F (37.1 °C)  Heart Rate:  [63-86] 67  Resp:  [16-20] 20  BP: ()/(49-94) 134/78    Intake/Output Summary (Last 24 hours) at 7/16/2021 0807  Last data filed at 7/15/2021 2100  Gross per 24 hour   Intake 480 ml   Output 1400 ml   Net -920 ml     No intake/output data recorded.    Physical Exam:     General Appearance:    Alert, cooperative, in no acute distress       Neck:   No adenopathy, supple, trachea midline, no thyromegaly, no JVD   Lungs:     Clear to auscultation,respirations regular, even and                unlabored    Heart:    Regular rhythm and normal rate, normal S1 and S2, no         murmur, no gallop, no rub, no click   Chest Wall:    No abnormalities observed   Abdomen:     Normal bowel sounds, no masses, no organomegaly, soft     nontender, nondistended, right groin stable   Extremities:   Moves all extremities well, no edema, no cyanosis, no           redness   Pulses:   Pulses palpable and equal bilaterally     Results Review:     I reviewed the patient's new clinical results.      WBC WBC   Date/Time Value Ref Range Status   07/16/2021 0347 4.22 3.40 - 10.80 10*3/mm3 Final   07/15/2021 0120 3.99 3.40 - 10.80 10*3/mm3 Final            HGB Hemoglobin   Date/Time Value Ref Range Status   07/16/2021 0347 9.7 (L) 13.0 - 17.7 g/dL Final   07/15/2021 0120 9.6 (L) 13.0 - 17.7 g/dL Final           HCT Hematocrit   Date/Time Value Ref Range Status   07/16/2021 0347 32.2 (L) 37.5 - 51.0 % Final   07/15/2021 0120 30.7 (L) 37.5 - 51.0 % Final            Platelets Platelets   Date/Time Value Ref Range Status   07/16/2021 0347 245 140 - 450 10*3/mm3 Final   07/15/2021 0120 262 140 - 450 10*3/mm3 Final      Sodium  Sodium   Date/Time Value Ref Range Status   07/16/2021 0347 131 (L) 136 - 145 mmol/L Final   07/15/2021 0120 137 136 - 145 mmol/L Final     Potassium  Potassium   Date/Time Value Ref Range Status   07/16/2021 0347 5.4 (H) 3.5 - 5.2 mmol/L Final     Comment:     Specimen hemolyzed.  Results may be affected.   07/15/2021 0120 4.5 3.5 - 5.2 mmol/L Final     Chloride  Chloride   Date/Time Value Ref Range Status   07/16/2021 0347 99 98 - 107 mmol/L Final   07/15/2021 0120 100 98 - 107 mmol/L Final     BicarbonateNo results found for: PLASMABICARB    BUN BUN   Date/Time Value Ref Range Status   07/16/2021 0347 17 8 - 23 mg/dL Final   07/15/2021 0120 17 8 - 23 mg/dL Final      Creatinine Creatinine   Date/Time Value Ref Range Status   07/16/2021 0347 1.28 (H) 0.76 - 1.27 mg/dL Final   07/15/2021 0120 1.08 0.76 - 1.27 mg/dL Final      Calcium Calcium   Date/Time Value Ref Range Status   07/16/2021 0347 9.0 8.6 - 10.5 mg/dL Final   07/15/2021 0120 9.2 8.6 - 10.5 mg/dL Final      Mag @RESULFAST(MG:3)@        PT/INR:       Lab Results   Component Value Date    PROTIME 13.1 07/15/2021    INR 1.02 07/15/2021      Troponin I:  No results found for: TROPONINI   Lab Results   Component Value Date    TROPONINT 0.012 07/15/2021       albuterol, 2.5 mg, Nebulization, Q4H - RT  aspirin, 81 mg, Oral, Daily  atorvastatin, 40 mg, Oral, Nightly  buprenorphine-naloxone, 2 tablet, Sublingual, Daily  clopidogrel, 75 mg, Oral, Daily  gabapentin, 800 mg, Oral, Q6H  heparin (porcine), 5,000 Units, Subcutaneous, Q8H  insulin detemir, 15 Units, Subcutaneous, Nightly  insulin lispro, 0-7 Units, Subcutaneous, TID AC  isosorbide mononitrate, 120 mg, Oral, Daily  lidocaine, 1 patch, Transdermal, Q24H  lisinopril, 5 mg, Oral, Daily  metoprolol tartrate, 50 mg, Oral, Q12H  multivitamin with minerals, 1 tablet, Oral, Daily  nicotine, 1 patch, Transdermal, Q24H  pantoprazole, 40 mg, Intravenous, Q12H  QUEtiapine, 25 mg, Oral, BID  sodium  chloride, 10 mL, Intravenous, Q12H           Assessment/Plan     Patient Active Problem List   Diagnosis Code   • Chest pain R07.9   • Hepatitis K75.9   • Hypertension I10   • Diabetes (CMS/HCC) E11.9   • Hyperlipidemia E78.5   • Unstable angina (CMS/HCC) I20.0     CV stable  3/3 patent bypass grafts  Normal EF  Ok home      RASHEL Baez  07/16/21  08:07 EDT

## 2021-07-16 NOTE — CASE MANAGEMENT/SOCIAL WORK
Discharge Planning Assessment  [facility]     Patient Name: Kenroy Rajan  MRN: [id number]  Today's Date: 7/16/2021    Admit Date: 7/14/2021    Discharge Needs Assessment    No documentation.       Discharge Plan     Row Name 07/16/21 1703       Plan    Plan  Home    Patient/Family in Agreement with Plan  yes    Plan Comments  Spoke with patient and his wife at bedside prior to discharge. Independent, has oxygen, nebulizer and an insulin pump.  No current HH.  He denied dc needs, wife to transport.    Final Discharge Disposition Code  01 - home or self-care        Continued Care and Services - Discharged on 7/16/2021 Admission date: 7/14/2021 - Discharge disposition: Home or Self Care   Coordination has not been started for this encounter.       Expected Discharge Date and Time     Expected Discharge Date Expected Discharge Time    Jul 16, 2021         Demographic Summary    No documentation.       Functional Status    No documentation.       Psychosocial    No documentation.       Abuse/Neglect    No documentation.       Legal    No documentation.       Substance Abuse    No documentation.       Patient Forms    No documentation.           Vaishali Aranda RN

## 2021-07-21 LAB
QT INTERVAL: 390 MS
QTC INTERVAL: 429 MS

## (undated) DEVICE — PINNACLE INTRODUCER SHEATH: Brand: PINNACLE

## (undated) DEVICE — CATH DIAG EXPO M/ PK 6FR FL4/FR4 PIG 3PK

## (undated) DEVICE — ST EXT IV SMARTSITE 2VLV SP M LL 5ML IV1

## (undated) DEVICE — ANGIO-SEAL VIP VASCULAR CLOSURE DEVICE: Brand: ANGIO-SEAL

## (undated) DEVICE — KT MANIFOLD CATHLAB CUST

## (undated) DEVICE — PK CATH CARD 10

## (undated) DEVICE — CATH DIAG EXPO .056 FL3.5 6F 100CM

## (undated) DEVICE — GW PERIPH VASC ADX J/TP SS .035 150CM 3MM

## (undated) DEVICE — ST INF PRI SMRTSTE 20DRP 2VLV 24ML 117